# Patient Record
Sex: MALE | Race: WHITE | NOT HISPANIC OR LATINO | Employment: OTHER | URBAN - METROPOLITAN AREA
[De-identification: names, ages, dates, MRNs, and addresses within clinical notes are randomized per-mention and may not be internally consistent; named-entity substitution may affect disease eponyms.]

---

## 2020-03-19 ENCOUNTER — RECORDS - HEALTHEAST (OUTPATIENT)
Dept: LAB | Facility: CLINIC | Age: 80
End: 2020-03-19

## 2020-03-19 LAB — INR PPP: 2.89 (ref 0.9–1.1)

## 2020-03-20 ENCOUNTER — RECORDS - HEALTHEAST (OUTPATIENT)
Dept: LAB | Facility: CLINIC | Age: 80
End: 2020-03-20

## 2020-03-23 LAB — INR PPP: 2.78 (ref 0.9–1.1)

## 2022-09-14 ENCOUNTER — OFFICE VISIT (OUTPATIENT)
Dept: FAMILY MEDICINE | Facility: CLINIC | Age: 82
End: 2022-09-14
Payer: COMMERCIAL

## 2022-09-14 VITALS
DIASTOLIC BLOOD PRESSURE: 78 MMHG | RESPIRATION RATE: 14 BRPM | SYSTOLIC BLOOD PRESSURE: 126 MMHG | OXYGEN SATURATION: 96 % | HEART RATE: 55 BPM | WEIGHT: 179 LBS | TEMPERATURE: 98 F

## 2022-09-14 DIAGNOSIS — I48.91 ATRIAL FIBRILLATION, UNSPECIFIED TYPE (H): ICD-10-CM

## 2022-09-14 DIAGNOSIS — U07.1 INFECTION DUE TO 2019 NOVEL CORONAVIRUS: Primary | ICD-10-CM

## 2022-09-14 PROCEDURE — 99204 OFFICE O/P NEW MOD 45 MIN: CPT | Performed by: FAMILY MEDICINE

## 2022-09-14 RX ORDER — WARFARIN SODIUM 2.5 MG/1
TABLET ORAL
COMMUNITY
Start: 2022-09-06

## 2022-09-14 NOTE — PROGRESS NOTES
ICD-10-CM    1. Infection due to 2019 novel coronavirus  U07.1 nirmatrelvir and ritonavir (PAXLOVID) therapy pack   2. Atrial fibrillation, unspecified type (H)  I48.91    discussed treatment he is on the cusp of 5 days. Will start paxlovid right away given his risk of age. Recommended inr recheck in 2 days. ER recommended if shortness of breath, worsening symptoms occur. side effects discussed.   -------------------------------  Chuy MCKINLEY Payan with presents with 5 days symptoms including eadaches and fatigue. Two home covid tests positive. No uri symptoms otherwise.     Immunized. gen healthy except takes coumadin for afib. .    Treatment measures tried include None tried.  Exposures--neuighbor also with covid  Recent travel--no    Current Outpatient Medications   Medication Sig Dispense Refill     nirmatrelvir and ritonavir (PAXLOVID) therapy pack Take 3 tablets by mouth 2 times daily for 5 days (Take 2 Nirmatrelvir tablets and 1 Ritonavir tablet twice daily for 5 days) 30 each 0     warfarin ANTICOAGULANT (COUMADIN) 2.5 MG tablet warfarin 2.5 mg tablet   TAKE 1 TABLET (2.5MG) EVERY MON, WED, FRI. TAKE 2 TABLETS ( 5 MG) ALL OTHER DAYS OR AS DIRECTED         ROS otherwise negative for resp., ID,  HEENT symptoms.    Objective: /78   Pulse 55   Temp 98  F (36.7  C) (Oral)   Resp 14   Wt 81.2 kg (179 lb)   SpO2 96%   Exam:  GENERAL APPEARANCE: healthy, alert and no distress  EYES: Eyes grossly normal to inspection

## 2022-09-14 NOTE — PATIENT INSTRUCTIONS
Check your inr in 2 days.     Or you can talk to your doctor to see if he thinks it is safe to stop the warfarin for 5 days while on paxlovid.